# Patient Record
Sex: FEMALE | Race: WHITE | HISPANIC OR LATINO | Employment: STUDENT | URBAN - METROPOLITAN AREA
[De-identification: names, ages, dates, MRNs, and addresses within clinical notes are randomized per-mention and may not be internally consistent; named-entity substitution may affect disease eponyms.]

---

## 2022-10-26 ENCOUNTER — OFFICE VISIT (OUTPATIENT)
Dept: INTERNAL MEDICINE | Facility: CLINIC | Age: 22
End: 2022-10-26

## 2022-10-26 VITALS
HEIGHT: 59 IN | RESPIRATION RATE: 18 BRPM | WEIGHT: 170.6 LBS | TEMPERATURE: 97.5 F | DIASTOLIC BLOOD PRESSURE: 64 MMHG | OXYGEN SATURATION: 100 % | HEART RATE: 80 BPM | BODY MASS INDEX: 34.39 KG/M2 | SYSTOLIC BLOOD PRESSURE: 112 MMHG

## 2022-10-26 DIAGNOSIS — R20.2 NUMBNESS AND TINGLING SENSATION OF SKIN: ICD-10-CM

## 2022-10-26 DIAGNOSIS — R20.2 NUMBNESS AND TINGLING IN BOTH HANDS: Primary | ICD-10-CM

## 2022-10-26 DIAGNOSIS — R20.0 NUMBNESS AND TINGLING SENSATION OF SKIN: ICD-10-CM

## 2022-10-26 DIAGNOSIS — R20.0 NUMBNESS AND TINGLING IN BOTH HANDS: Primary | ICD-10-CM

## 2022-10-26 PROCEDURE — 99203 OFFICE O/P NEW LOW 30 MIN: CPT | Performed by: FAMILY MEDICINE

## 2022-10-26 NOTE — PROGRESS NOTES
"    Office Note     Name: ORALIA Lester    : 2000     MRN: [id number]     Chief Complaint  Establish Care, Numbness (Ongoing burning/numbness in her hands/arms right worse than left. ), and abdominal numbness (Ongoing since , numbness in the lower abdomen around the incision site)    Subjective     History of Present Illness:  ORALIA Lester is a 22 y.o. female who presents today for establishing care.  She states that over the last few months she has been having numbness and tingling in both hands and is worse in the right hand and she is right-hand dominant.  It is worse in certain positions and with certain activities and is worse in certain fingers usually.  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  She also has numbness in the area of the incision of a  she has had within the last couple of years and it does not necessarily bother her but she was just wanting to have it checked to see if it is something that would eventually go away.  No drainage from the .  She has no other chronic medical problems.    Past medical history: As above    Social history: Positive tobacco and that she uses vapor cigarettes and states she is working on quitting, occasional EtOH, she lives at home with her son and boyfriend and she works cleaning houses.    Review of Systems   Respiratory: Negative for cough, shortness of breath and wheezing.    Cardiovascular: Negative for chest pain and palpitations.   Gastrointestinal: Negative for blood in stool, diarrhea and vomiting.   Genitourinary: Negative for dysuria, flank pain and genital sores.       Objective     Vital Signs  /64   Pulse 80   Temp 97.5 °F (36.4 °C) (Infrared)   Resp 18   Ht 151 cm (59.45\")   Wt 77.4 kg (170 lb 9.6 oz)   SpO2 100%   BMI 33.94 kg/m²   Estimated body mass index is 33.94 kg/m² as calculated from the following:    Height as of this encounter: 151 cm (59.45\").    Weight as of this " encounter: 77.4 kg (170 lb 9.6 oz).          Physical Exam  HENT:      Head: Normocephalic and atraumatic.   Neck:      Vascular: No carotid bruit.   Cardiovascular:      Rate and Rhythm: Normal rate and regular rhythm.      Heart sounds: Normal heart sounds. No murmur heard.    No gallop.   Pulmonary:      Effort: Pulmonary effort is normal. No respiratory distress.      Breath sounds: No stridor. No wheezing, rhonchi or rales.   Abdominal:      General: Abdomen is flat. A surgical scar is present. Bowel sounds are normal.      Palpations: Abdomen is soft.   Musculoskeletal:      Cervical back: Normal range of motion and neck supple.      Right lower leg: No edema.      Left lower leg: No edema.   Lymphadenopathy:      Cervical: No cervical adenopathy.   Neurological:      Mental Status: She is alert.          Lab Review:           Assessment and Plan     Diagnoses and all orders for this visit:    1. Numbness and tingling in both hands (Primary)--discussed tx options  -     EMG & Nerve Conduction Test; Future    2. Numbness and tingling sensation of skin---in the incision area of her         Follow Up  Return in about 3 months (around 2023) for Recheck.    Angeles Corea DO

## 2023-04-07 PROCEDURE — 87081 CULTURE SCREEN ONLY: CPT

## 2025-03-18 ENCOUNTER — PATIENT ROUNDING (BHMG ONLY) (OUTPATIENT)
Dept: URGENT CARE | Facility: CLINIC | Age: 25
End: 2025-03-18
Payer: COMMERCIAL